# Patient Record
Sex: MALE | Race: OTHER | HISPANIC OR LATINO
[De-identification: names, ages, dates, MRNs, and addresses within clinical notes are randomized per-mention and may not be internally consistent; named-entity substitution may affect disease eponyms.]

---

## 2019-08-21 ENCOUNTER — TRANSCRIPTION ENCOUNTER (OUTPATIENT)
Age: 34
End: 2019-08-21

## 2022-03-31 ENCOUNTER — TRANSCRIPTION ENCOUNTER (OUTPATIENT)
Age: 37
End: 2022-03-31

## 2022-07-26 ENCOUNTER — INPATIENT (INPATIENT)
Facility: HOSPITAL | Age: 37
LOS: 0 days | Discharge: HOME | DRG: 603 | End: 2022-07-27
Attending: INTERNAL MEDICINE | Admitting: INTERNAL MEDICINE
Payer: COMMERCIAL

## 2022-07-26 VITALS
OXYGEN SATURATION: 98 % | DIASTOLIC BLOOD PRESSURE: 70 MMHG | SYSTOLIC BLOOD PRESSURE: 117 MMHG | RESPIRATION RATE: 16 BRPM | TEMPERATURE: 98 F | WEIGHT: 190.04 LBS | HEIGHT: 69 IN | HEART RATE: 59 BPM

## 2022-07-26 DIAGNOSIS — R21 RASH AND OTHER NONSPECIFIC SKIN ERUPTION: ICD-10-CM

## 2022-07-26 LAB
ALBUMIN SERPL ELPH-MCNC: 4.7 G/DL — SIGNIFICANT CHANGE UP (ref 3.3–5)
ALP SERPL-CCNC: 56 U/L — SIGNIFICANT CHANGE UP (ref 40–120)
ALT FLD-CCNC: 21 U/L — SIGNIFICANT CHANGE UP (ref 10–45)
ANION GAP SERPL CALC-SCNC: 16 MMOL/L — SIGNIFICANT CHANGE UP (ref 5–17)
AST SERPL-CCNC: 25 U/L — SIGNIFICANT CHANGE UP (ref 10–40)
BASOPHILS # BLD AUTO: 0.01 K/UL — SIGNIFICANT CHANGE UP (ref 0–0.2)
BASOPHILS NFR BLD AUTO: 0.1 % — SIGNIFICANT CHANGE UP (ref 0–2)
BILIRUB SERPL-MCNC: 0.8 MG/DL — SIGNIFICANT CHANGE UP (ref 0.2–1.2)
BUN SERPL-MCNC: 17 MG/DL — SIGNIFICANT CHANGE UP (ref 7–23)
CALCIUM SERPL-MCNC: 9.1 MG/DL — SIGNIFICANT CHANGE UP (ref 8.4–10.5)
CHLORIDE SERPL-SCNC: 101 MMOL/L — SIGNIFICANT CHANGE UP (ref 96–108)
CO2 SERPL-SCNC: 20 MMOL/L — LOW (ref 22–31)
CREAT SERPL-MCNC: 0.97 MG/DL — SIGNIFICANT CHANGE UP (ref 0.5–1.3)
EGFR: 102 ML/MIN/1.73M2 — SIGNIFICANT CHANGE UP
EOSINOPHIL # BLD AUTO: 0.1 K/UL — SIGNIFICANT CHANGE UP (ref 0–0.5)
EOSINOPHIL NFR BLD AUTO: 1 % — SIGNIFICANT CHANGE UP (ref 0–6)
GLUCOSE SERPL-MCNC: 86 MG/DL — SIGNIFICANT CHANGE UP (ref 70–99)
HCT VFR BLD CALC: 49.8 % — SIGNIFICANT CHANGE UP (ref 39–50)
HGB BLD-MCNC: 17.4 G/DL — HIGH (ref 13–17)
IMM GRANULOCYTES NFR BLD AUTO: 0.4 % — SIGNIFICANT CHANGE UP (ref 0–1.5)
LYMPHOCYTES # BLD AUTO: 2.11 K/UL — SIGNIFICANT CHANGE UP (ref 1–3.3)
LYMPHOCYTES # BLD AUTO: 20.8 % — SIGNIFICANT CHANGE UP (ref 13–44)
MCHC RBC-ENTMCNC: 31.4 PG — SIGNIFICANT CHANGE UP (ref 27–34)
MCHC RBC-ENTMCNC: 34.9 GM/DL — SIGNIFICANT CHANGE UP (ref 32–36)
MCV RBC AUTO: 89.7 FL — SIGNIFICANT CHANGE UP (ref 80–100)
MONOCYTES # BLD AUTO: 0.78 K/UL — SIGNIFICANT CHANGE UP (ref 0–0.9)
MONOCYTES NFR BLD AUTO: 7.7 % — SIGNIFICANT CHANGE UP (ref 2–14)
NEUTROPHILS # BLD AUTO: 7.09 K/UL — SIGNIFICANT CHANGE UP (ref 1.8–7.4)
NEUTROPHILS NFR BLD AUTO: 70 % — SIGNIFICANT CHANGE UP (ref 43–77)
NRBC # BLD: 0 /100 WBCS — SIGNIFICANT CHANGE UP (ref 0–0)
PLATELET # BLD AUTO: 263 K/UL — SIGNIFICANT CHANGE UP (ref 150–400)
POTASSIUM SERPL-MCNC: 3.7 MMOL/L — SIGNIFICANT CHANGE UP (ref 3.5–5.3)
POTASSIUM SERPL-SCNC: 3.7 MMOL/L — SIGNIFICANT CHANGE UP (ref 3.5–5.3)
PROT SERPL-MCNC: 7.6 G/DL — SIGNIFICANT CHANGE UP (ref 6–8.3)
RBC # BLD: 5.55 M/UL — SIGNIFICANT CHANGE UP (ref 4.2–5.8)
RBC # FLD: 12.1 % — SIGNIFICANT CHANGE UP (ref 10.3–14.5)
SARS-COV-2 RNA SPEC QL NAA+PROBE: SIGNIFICANT CHANGE UP
SODIUM SERPL-SCNC: 137 MMOL/L — SIGNIFICANT CHANGE UP (ref 135–145)
WBC # BLD: 10.13 K/UL — SIGNIFICANT CHANGE UP (ref 3.8–10.5)
WBC # FLD AUTO: 10.13 K/UL — SIGNIFICANT CHANGE UP (ref 3.8–10.5)

## 2022-07-26 PROCEDURE — 73070 X-RAY EXAM OF ELBOW: CPT | Mod: 26,50

## 2022-07-26 PROCEDURE — 71046 X-RAY EXAM CHEST 2 VIEWS: CPT | Mod: 26

## 2022-07-26 PROCEDURE — 99223 1ST HOSP IP/OBS HIGH 75: CPT | Mod: GC

## 2022-07-26 PROCEDURE — 73090 X-RAY EXAM OF FOREARM: CPT | Mod: 26,50

## 2022-07-26 PROCEDURE — 99285 EMERGENCY DEPT VISIT HI MDM: CPT

## 2022-07-26 RX ORDER — DEXAMETHASONE 0.5 MG/5ML
6 ELIXIR ORAL ONCE
Refills: 0 | Status: COMPLETED | OUTPATIENT
Start: 2022-07-26 | End: 2022-07-26

## 2022-07-26 RX ORDER — KETOROLAC TROMETHAMINE 30 MG/ML
15 SYRINGE (ML) INJECTION ONCE
Refills: 0 | Status: DISCONTINUED | OUTPATIENT
Start: 2022-07-26 | End: 2022-07-26

## 2022-07-26 RX ORDER — DIPHENHYDRAMINE HCL 50 MG
50 CAPSULE ORAL ONCE
Refills: 0 | Status: COMPLETED | OUTPATIENT
Start: 2022-07-26 | End: 2022-07-26

## 2022-07-26 RX ORDER — LORATADINE 10 MG/1
10 TABLET ORAL DAILY
Refills: 0 | Status: DISCONTINUED | OUTPATIENT
Start: 2022-07-26 | End: 2022-07-27

## 2022-07-26 RX ORDER — CEFTRIAXONE 500 MG/1
2000 INJECTION, POWDER, FOR SOLUTION INTRAMUSCULAR; INTRAVENOUS EVERY 24 HOURS
Refills: 0 | Status: DISCONTINUED | OUTPATIENT
Start: 2022-07-26 | End: 2022-07-27

## 2022-07-26 RX ORDER — FAMOTIDINE 10 MG/ML
20 INJECTION INTRAVENOUS DAILY
Refills: 0 | Status: DISCONTINUED | OUTPATIENT
Start: 2022-07-26 | End: 2022-07-27

## 2022-07-26 RX ORDER — CEFAZOLIN SODIUM 1 G
2000 VIAL (EA) INJECTION ONCE
Refills: 0 | Status: COMPLETED | OUTPATIENT
Start: 2022-07-26 | End: 2022-07-26

## 2022-07-26 RX ADMIN — FAMOTIDINE 20 MILLIGRAM(S): 10 INJECTION INTRAVENOUS at 20:45

## 2022-07-26 RX ADMIN — Medication 100 MILLIGRAM(S): at 18:55

## 2022-07-26 RX ADMIN — Medication 100 MILLIGRAM(S): at 13:42

## 2022-07-26 RX ADMIN — Medication 50 MILLIGRAM(S): at 13:27

## 2022-07-26 RX ADMIN — Medication 4 MILLIGRAM(S): at 20:45

## 2022-07-26 RX ADMIN — LORATADINE 10 MILLIGRAM(S): 10 TABLET ORAL at 20:45

## 2022-07-26 RX ADMIN — Medication 6 MILLIGRAM(S): at 13:46

## 2022-07-26 RX ADMIN — CEFTRIAXONE 100 MILLIGRAM(S): 500 INJECTION, POWDER, FOR SOLUTION INTRAMUSCULAR; INTRAVENOUS at 18:55

## 2022-07-26 RX ADMIN — Medication 15 MILLIGRAM(S): at 13:42

## 2022-07-26 NOTE — ED ADULT NURSE NOTE - OBJECTIVE STATEMENT
Patient is a 37 year old male complaining of bilateral arm swelling and itching. Arrived by EMS. Pt states he was performing water drills and swimming for [employer] when he noticed swelling and redness in his arms. Pt states he was only surface swimming. Pt denies any trauma to the area. Upon exam, redness and swelling is symmetrical and skin is warm to touch. Denies numbness or tinlging in the extremities. Pt reports no PMH. Patient is A&O x4. Denies complaints of chest pain, sob, fevers, chills, n/v/d, headache, syncope, burning uriantion, blood in urine, blood in stool. Safety and comfort maintained. Will continue to monitor.

## 2022-07-26 NOTE — H&P ADULT - ATTENDING COMMENTS
37M [employer] was doing drills and swimming earlier today 7/26/22  when he noticed he had some swelling and redness of b/l forearms.  Did not hit them on anything, didn't have any trauma, no new exposures, was only surface swimming was not diving.  Redness and swelling is symmetrical associated with feeing tense in his forearms. No numbness distal to swelling.  No new soaps, detergents, pets, or any other known exposures.  Never had anything like this before, no swelling of lips or tongue, no difficulty breathing or swallowing.  Other people were in the water with him but he is the only one with a rash.  Nothing makes it better or worse.  no fevers no chills.  + Pruritic    DIAGNOSIS and IMPRESSION  B/L forearm Cellulitis    Afebrile, no leukocytosis, non toxic  No pain, significant itching, symmetrical lesion w/o wound  Pt did have a bug bite today   - while atyplcal presentation of cellulitis with possible waterborne organism is possible, the distribution and absence of clinical toxicity suggests a toxic exposure  skin irritation can result from exposure to toxins associated with algae. In salt and brackish water Diatoms and Dinoflagellates are implicated    RECOMMENDATIONS  DERMATOLOGY CONSULTATION  Recommend Blood cx x 2  Recommend CT with Contrast of B/L UE  Recommend Doxycycline 100mg BID PO and Ceftriaxone 2gm Q24 to cover for possible water borne pathogens (Vibrio vulnificus, Strep, aeromonas, erysipelothrix rhusiopathiae)

## 2022-07-26 NOTE — ED PROVIDER NOTE - ATTENDING CONTRIBUTION TO CARE
Attending (Gianni Mishra D.O.):  I have personally seen and examined this patient. I have performed a substantive portion of the visit including all aspects of the medical decision making. Resident, fellow, and/or ACP note reviewed. I agree on the plan of care except where noted.    see mdm

## 2022-07-26 NOTE — ED PROVIDER NOTE - NS ED ATTENDING STATEMENT MOD
I have seen and examined this patient and fully participated in the care of this patient as the teaching attending.  The service was shared with the ISABELLE.  I reviewed and verified the documentation and independently performed the documented:

## 2022-07-26 NOTE — CONSULT NOTE ADULT - ASSESSMENT
Mr. Drummond is a 37 year old gentleman [employer] was doing drills and swimming earlier today when he noticed he had some swelling and redness of b/l forearms.  Did not hit them on anything, didn't have any trauma, no new exposures, was only surface swimming was not diving.  Redness and swelling is symmetrical associated with feeing tense in his forearms. No numbness distal to swelling.  No new soaps, detergents, pets, or any other known exposures.  Never had anything like this before, no swelling of lips or tongue, no difficulty breathing or swallowing.  Other people were in the water with him but he is the only one with a rash.  Nothing makes it better or worse.  no fevers no chills.  + Pruritic  Mr. Drummond is a 37 year old gentleman [employer] was doing drills and swimming earlier today when he noticed he had some swelling and redness of b/l forearms.  Did not hit them on anything, didn't have any trauma, no new exposures, was only surface swimming was not diving.  Redness and swelling is symmetrical associated with feeing tense in his forearms. No numbness distal to swelling.  No new soaps, detergents, pets, or any other known exposures.  Never had anything like this before, no swelling of lips or tongue, no difficulty breathing or swallowing.  Other people were in the water with him but he is the only one with a rash.  Nothing makes it better or worse.  no fevers no chills.  + Pruritic    DIAGNOSIS and IMPRESSION  ·	B/L forearm Cellulitis    Afebrile, no leukocytosis, non toxic  No pain, significant itching, symmetrical lesion w/o wound  Pt did have a bug bite today    RECOMMENDATIONS  Recommend Blood cx x 2  Recommend CT with Contrast of B/L UE  Recommend Derm consult r/o non-infectious etiology given nontoxic presentation and significant pruritis  Recommend Doxycycline 100mg BID PO and Ceftriaxone 2gm Q24 to cover for possible water borne pathogens (Vibrio vulnificus, Strep, aeromonas, erysipelothrix rhusiopathiae)    Pt seen and examined. Case d/w attending and primary team    Lopez Hernandez MD, PGY5  ID fellow  [employer] Teams Preferred  After 5pm/weekends call [phone]

## 2022-07-26 NOTE — H&P ADULT - ASSESSMENT
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  He did not hit them on anything, didn't have any trauma, no new exposures, was only surface swimming was not diving.  Redness and swelling is symmetrical associated with feeing tense in his forearms. No numbness distal to swelling.  No new soaps, detergents, pets, or any other known exposures.  Never had anything like this before, no swelling of lips or tongue, no difficulty breathing or swallowing.  Other people were in the water with him but he is the only one with a rash.  Nothing makes it better or worse.  no fevers no chills.  + Pruritic    DIAGNOSIS and IMPRESSION  ·	B/L forearm Cellulitis    Afebrile, no leukocytosis, non toxic  No pain, significant itching, symmetrical lesion w/o wound  Pt did have a bug bite today  area of erythema and edema was demarcated  ID consult called and reviewed   blood cx x 2 sent  CT w contrast b/l UE  derm consult called to r/o non-infectious etiology given nontoxic presentation and significant pruritis  ptn received decadron, ancef, benadryl in the ED  as per ID: will place on Doxycycline 100mg BID PO and Ceftriaxone 2gm Q24 to cover for possible water borne pathogens (Vibrio vulnificus, Strep, aeromonas, erysipelothrix rhusiopathiae)  will cont steroids  will start H1 and H2 blocking antihistamines  dvt ppx w ambulation

## 2022-07-26 NOTE — ED PROVIDER NOTE - PROGRESS NOTE DETAILS
Pt doing well no complaints.  Some increased erythema now spreading down to the hand.  Arms still soft and compartments are non tender.  No numbness tingling in the hands.  [place] Attending (Gianni Mishra D.O.):  Slight increased erythema tracking along right dorsal surface of hand from forearm., No focal crepitations. Will get xray to eval bones. Compartments soft but edema present.

## 2022-07-26 NOTE — H&P ADULT - NSHPPHYSICALEXAM_GEN_ALL_CORE
T(F): 98.4 (07-26-22 @ 19:57), Max: 98.4 (07-26-22 @ 19:57)  HR: 77 (07-26-22 @ 19:57) (59 - 77)  BP: 113/71 (07-26-22 @ 19:57) (113/71 - 117/70)  RR: 18 (07-26-22 @ 19:57) (16 - 18)  SpO2: 96% (07-26-22 @ 19:57) (96% - 98%)    PHYSICAL EXAM:  GENERAL: NAD, well-developed  HEAD:  Atraumatic, Normocephalic  EYES: EOMI, PERRLA, conjunctiva and sclera clear  NECK: Supple, No JVD  CHEST/LUNG: Clear to auscultation bilaterally; No wheeze  HEART: Regular rate and rhythm; No murmurs, rubs, or gallops  ABDOMEN: Soft, Nontender, Nondistended; Bowel sounds present  EXTREMITIES:  2+ Peripheral Pulses, No clubbing, cyanosis, or edema  PSYCH: AAOx3  NEUROLOGY: non-focal  SKIN: No rashes or lesions

## 2022-07-26 NOTE — ED PROVIDER NOTE - MUSCULOSKELETAL, MLM
b/l UE's full ROM.  + olecranon effusion palpable b/l.  b/l forearms tense and swollen but no pain with palpation of forearms and biceps and compartments are all soft

## 2022-07-26 NOTE — ED PROVIDER NOTE - NSFOLLOWUPINSTRUCTIONS_ED_ALL_ED_FT
1- Prednisone 40 mg daily for the next 4 days  2- Keflex 500 mg every 8 hours for the next 10 days  3- Follow up with your doctor or our dermatology clinic  4- If you have any worsening rash, swelling,. numbness/tingling, pain, fevers, or any new or worsening concerns come back to the ER immediately

## 2022-07-26 NOTE — CONSULT NOTE ADULT - SUBJECTIVE AND OBJECTIVE BOX
Patient is a 37y old  Male who presents with a chief complaint of     HPI: Mr. Drummond is a 37 year old gentleman [employer] was doing drills and swimming earlier today when he noticed he had some swelling and redness of b/l forearms.  Did not hit them on anything, didn't have any trauma, no new exposures, was only surface swimming was not diving.  Redness and swelling is symmetrical associated with feeing tense in his forearms. No numbness distal to swelling.  No new soaps, detergents, pets, or any other known exposures.  Never had anything like this before, no swelling of lips or tongue, no difficulty breathing or swallowing.  Other people were in the water with him but he is the only one with a rash.  Nothing makes it better or worse.  no fevers no chills.  + Pruritic       REVIEW OF SYSTEMS  [  ] ROS unobtainable because:    [ x ] All other systems negative except as noted below    Constitutional:  [ ] fever [ ] chills  [ ] weight loss  [ ]night sweat  [ ]poor appetite/PO intake [ ]fatigue   Skin:  [ ] rash [ ] phlebitis	  Eyes: [ ] icterus [ ] pain  [ ] discharge	  ENMT: [ ] sore throat  [ ] thrush [ ] ulcers [ ] exudates [ ]anosmia  Respiratory: [ ] dyspnea [ ] hemoptysis [ ] cough [ ] sputum	  Cardiovascular:  [ ] chest pain [ ] palpitations [ ] edema	  Gastrointestinal:  [ ] nausea [ ] vomiting [ ] diarrhea [ ] constipation [ ] pain	  Genitourinary:  [ ] dysuria [ ] frequency [ ] hematuria [ ] discharge [ ] flank pain  [ ] incontinence  Musculoskeletal:  [ ] myalgias [ ] arthralgias [ ] arthritis  [ ] back pain  Neurological:  [ ] headache [ ] weakness [ ] seizures  [ ] confusion/altered mental status    prior hospital charts reviewed [V]  primary team notes reviewed [V]  other consultant notes reviewed [V]    PAST MEDICAL & SURGICAL HISTORY:      SOCIAL HISTORY:  - Denied smoking/vaping/alcohol/recreational drug use    FAMILY HISTORY:      Allergies  No Known Allergies        ANTIMICROBIALS:      ANTIMICROBIALS (past 90 days):  MEDICATIONS  (STANDING):  ceFAZolin   IVPB   100 mL/Hr IV Intermittent (07-26-22 @ 13:42)        OTHER MEDS:   MEDICATIONS  (STANDING):      VITALS:  Vital Signs Last 24 Hrs  T(F): 97.7 (07-26-22 @ 12:50), Max: 97.7 (07-26-22 @ 12:50)    Vital Signs Last 24 Hrs  HR: 59 (07-26-22 @ 12:50) (59 - 59)  BP: 117/70 (07-26-22 @ 12:50) (117/70 - 117/70)  RR: 16 (07-26-22 @ 12:50)  SpO2: 98% (07-26-22 @ 12:50) (98% - 98%)  Wt(kg): --    EXAM:    GA: NAD, AOx3  HEENT: oral cavity no lesion  CV: nl S1/S2, no RMG  Lungs: CTAB, No distress  Abd: BS+, soft, nontender, no rebounding pain  Ext: no edema  Neuro: No focal deficits  Skin: Intact  IV: no phlebitis    Labs:                        17.4   10.13 )-----------( 263      ( 26 Jul 2022 14:23 )             49.8     07-26    137  |  101  |  17  ----------------------------<  86  3.7   |  20<L>  |  0.97    Ca    9.1      26 Jul 2022 14:23    TPro  7.6  /  Alb  4.7  /  TBili  0.8  /  DBili  x   /  AST  25  /  ALT  21  /  AlkPhos  56  07-26      WBC Trend:  WBC Count: 10.13 (07-26-22 @ 14:23)      Auto Neutrophil #: 7.09 K/uL (07-26-22 @ 14:23)      Creatine Trend:  Creatinine, Serum: 0.97 (07-26)      Liver Biochemical Testing Trend:  Alanine Aminotransferase (ALT/SGPT): 21 (07-26)  Aspartate Aminotransferase (AST/SGOT): 25 (07-26-22 @ 14:23)  Bilirubin Total, Serum: 0.8 (07-26)  Trend LDH  Auto Eosinophil %: 1.0 % (07-26-22 @ 14:23)      MICROBIOLOGY:    C-Reactive Protein, Serum: 21 (07-26)      RADIOLOGY:  imaging below personally reviewed     Patient is a 37y old  Male who presents with a chief complaint of     HPI: Mr. Drummond is a 37 year old gentleman with no PMH who is a [employer] who [employer] was doing drills and swimming earlier today when he noticed he had some swelling and redness of b/l forearms.  Did not hit them on anything, didn't have any trauma, no new exposures, was only surface swimming was not diving.  Redness and swelling is symmetrical associated with feeing tense in his forearms. No numbness distal to swelling.  No new soaps, detergents, pets, or any other known exposures.  Never had anything like this before, no swelling of lips or tongue, no difficulty breathing or swallowing.  Other people were in the water with him but he is the only one with a rash.  Nothing makes it better or worse.  no fevers no chills.  + Pruritic       REVIEW OF SYSTEMS  [  ] ROS unobtainable because:    [ x ] All other systems negative except as noted below    Constitutional:  [ ] fever [ ] chills  [ ] weight loss  [ ]night sweat  [ ]poor appetite/PO intake [ ]fatigue   Skin:  [ ] rash [ ] phlebitis	  Eyes: [ ] icterus [ ] pain  [ ] discharge	  ENMT: [ ] sore throat  [ ] thrush [ ] ulcers [ ] exudates [ ]anosmia  Respiratory: [ ] dyspnea [ ] hemoptysis [ ] cough [ ] sputum	  Cardiovascular:  [ ] chest pain [ ] palpitations [ ] edema	  Gastrointestinal:  [ ] nausea [ ] vomiting [ ] diarrhea [ ] constipation [ ] pain	  Genitourinary:  [ ] dysuria [ ] frequency [ ] hematuria [ ] discharge [ ] flank pain  [ ] incontinence  Musculoskeletal:  [ ] myalgias [ ] arthralgias [ ] arthritis  [ ] back pain  Neurological:  [ ] headache [ ] weakness [ ] seizures  [ ] confusion/altered mental status    prior hospital charts reviewed [V]  primary team notes reviewed [V]  other consultant notes reviewed [V]    PAST MEDICAL & SURGICAL HISTORY:      SOCIAL HISTORY:  - Denied smoking/vaping/alcohol/recreational drug use    FAMILY HISTORY:      Allergies  No Known Allergies        ANTIMICROBIALS:      ANTIMICROBIALS (past 90 days):  MEDICATIONS  (STANDING):  ceFAZolin   IVPB   100 mL/Hr IV Intermittent (07-26-22 @ 13:42)        OTHER MEDS:   MEDICATIONS  (STANDING):      VITALS:  Vital Signs Last 24 Hrs  T(F): 97.7 (07-26-22 @ 12:50), Max: 97.7 (07-26-22 @ 12:50)    Vital Signs Last 24 Hrs  HR: 59 (07-26-22 @ 12:50) (59 - 59)  BP: 117/70 (07-26-22 @ 12:50) (117/70 - 117/70)  RR: 16 (07-26-22 @ 12:50)  SpO2: 98% (07-26-22 @ 12:50) (98% - 98%)  Wt(kg): --    EXAM:    GA: NAD, AOx3  HEENT: oral cavity no lesion  CV: nl S1/S2, no RMG  Lungs: CTAB, No distress  Abd: BS+, soft, nontender, no rebounding pain  Ext: no edema  Neuro: No focal deficits  Skin: Intact  IV: no phlebitis    Labs:                        17.4   10.13 )-----------( 263      ( 26 Jul 2022 14:23 )             49.8     07-26    137  |  101  |  17  ----------------------------<  86  3.7   |  20<L>  |  0.97    Ca    9.1      26 Jul 2022 14:23    TPro  7.6  /  Alb  4.7  /  TBili  0.8  /  DBili  x   /  AST  25  /  ALT  21  /  AlkPhos  56  07-26      WBC Trend:  WBC Count: 10.13 (07-26-22 @ 14:23)      Auto Neutrophil #: 7.09 K/uL (07-26-22 @ 14:23)      Creatine Trend:  Creatinine, Serum: 0.97 (07-26)      Liver Biochemical Testing Trend:  Alanine Aminotransferase (ALT/SGPT): 21 (07-26)  Aspartate Aminotransferase (AST/SGOT): 25 (07-26-22 @ 14:23)  Bilirubin Total, Serum: 0.8 (07-26)  Trend LDH  Auto Eosinophil %: 1.0 % (07-26-22 @ 14:23)      MICROBIOLOGY:    C-Reactive Protein, Serum: 21 (07-26)      RADIOLOGY:  imaging below personally reviewed     Patient is a 37y old  Male who presents with a chief complaint of     HPI: Mr. Drummond is a 37 year old gentleman with no PMH who is a [employer] who [employer] was doing drills and swimming earlier today when he noticed he had some swelling and redness of b/l forearms.  Did not hit them on anything, didn't have any trauma, no new exposures, was only surface swimming was not diving in brackish [place].  Redness and swelling is symmetrical associated with feeing tense in his forearms. No numbness distal to swelling.  No new soaps, detergents, pets, or any other known exposures. Other people were in the water with him but he is the only one with a rash.  No fevers no chills. He does note significant pruritis.        REVIEW OF SYSTEMS  [  ] ROS unobtainable because:    [ x ] All other systems negative except as noted below    Constitutional:  [ ] fever [ ] chills  [ ] weight loss  [ ]night sweat  [ ]poor appetite/PO intake [ ]fatigue   Skin:  [ ] rash [ ] phlebitis	  Eyes: [ ] icterus [ ] pain  [ ] discharge	  ENMT: [ ] sore throat  [ ] thrush [ ] ulcers [ ] exudates [ ]anosmia  Respiratory: [ ] dyspnea [ ] hemoptysis [ ] cough [ ] sputum	  Cardiovascular:  [ ] chest pain [ ] palpitations [ ] edema	  Gastrointestinal:  [ ] nausea [ ] vomiting [ ] diarrhea [ ] constipation [ ] pain	  Genitourinary:  [ ] dysuria [ ] frequency [ ] hematuria [ ] discharge [ ] flank pain  [ ] incontinence  Musculoskeletal:  [ ] myalgias [ ] arthralgias [ ] arthritis  [ ] back pain  Neurological:  [ ] headache [ ] weakness [ ] seizures  [ ] confusion/altered mental status    prior hospital charts reviewed [V]  primary team notes reviewed [V]  other consultant notes reviewed [V]    PAST MEDICAL & SURGICAL HISTORY:  No significant PMH    SOCIAL HISTORY:  - Denied smoking/vaping/alcohol/recreational drug use    FAMILY HISTORY:  DM and breast CA in mothers side    Allergies  No Known Allergies        ANTIMICROBIALS:      ANTIMICROBIALS (past 90 days):  MEDICATIONS  (STANDING):  ceFAZolin   IVPB   100 mL/Hr IV Intermittent (07-26-22 @ 13:42)        OTHER MEDS:   MEDICATIONS  (STANDING):      VITALS:  Vital Signs Last 24 Hrs  T(F): 97.7 (07-26-22 @ 12:50), Max: 97.7 (07-26-22 @ 12:50)    Vital Signs Last 24 Hrs  HR: 59 (07-26-22 @ 12:50) (59 - 59)  BP: 117/70 (07-26-22 @ 12:50) (117/70 - 117/70)  RR: 16 (07-26-22 @ 12:50)  SpO2: 98% (07-26-22 @ 12:50) (98% - 98%)  Wt(kg): --    EXAM:    Constitutional: Not in acute distress  Eyes: pupils bilaterally reactive to light. No icterus.  Oral cavity: Clear, no lesions  Neck: No neck vein distension noted  RS: Chest clear to auscultation bilaterally. No wheeze/rhonchi/crepitations.  CVS: S1, S2 heard. Regular rate and rhythm. No murmurs/rubs/gallops.  Abdomen: Soft. No guarding/rigidity/tenderness.  : No acute abnormalities  Extremities: B/L Forearm and elbow symmetrical redness, swelling. No limitation of ROM. No warmth or tenderness or wounds. Left axillary bug bite with erythema  Skin: No lesions noted  Vascular: No evidence of phlebitis  Neuro: Alert, oriented to time/place/person  Cranial nerves 2-12 grossly normal. No focal abnormalities    Labs:                        17.4   10.13 )-----------( 263      ( 26 Jul 2022 14:23 )             49.8     07-26    137  |  101  |  17  ----------------------------<  86  3.7   |  20<L>  |  0.97    Ca    9.1      26 Jul 2022 14:23    TPro  7.6  /  Alb  4.7  /  TBili  0.8  /  DBili  x   /  AST  25  /  ALT  21  /  AlkPhos  56  07-26      WBC Trend:  WBC Count: 10.13 (07-26-22 @ 14:23)      Auto Neutrophil #: 7.09 K/uL (07-26-22 @ 14:23)      Creatine Trend:  Creatinine, Serum: 0.97 (07-26)      Liver Biochemical Testing Trend:  Alanine Aminotransferase (ALT/SGPT): 21 (07-26)  Aspartate Aminotransferase (AST/SGOT): 25 (07-26-22 @ 14:23)  Bilirubin Total, Serum: 0.8 (07-26)  Trend LDH  Auto Eosinophil %: 1.0 % (07-26-22 @ 14:23)      MICROBIOLOGY:    C-Reactive Protein, Serum: 21 (07-26)      RADIOLOGY:  imaging below personally reviewed     Patient is a 37y old  Male who presents with a chief complaint of     HPI: Mr. 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  Did not hit them on anything, didn't have any trauma, no new exposures, was only surface swimming was not diving in brackish water by [place].  Redness and swelling is symmetrical associated with feeing tense in his forearms. No numbness distal to swelling.  No new soaps, detergents, pets, or any other known exposures. Other people were in the water with him but he is the only one with a rash.  No fevers no chills. He does note significant pruritis.   no others had reaction  water appeared murky (current heatwave)  he is alert, comfortable without pain  - notes local pruritus       REVIEW OF SYSTEMS  [  ] ROS unobtainable because:    [ x ] All other systems negative except as noted below    Constitutional:  [ ] fever [ ] chills  [ ] weight loss  [ ]night sweat  [ ]poor appetite/PO intake [ ]fatigue   Skin:  [ ] rash [ ] phlebitis	  Eyes: [ ] icterus [ ] pain  [ ] discharge	  ENMT: [ ] sore throat  [ ] thrush [ ] ulcers [ ] exudates [ ]anosmia  Respiratory: [ ] dyspnea [ ] hemoptysis [ ] cough [ ] sputum	  Cardiovascular:  [ ] chest pain [ ] palpitations [ ] edema	  Gastrointestinal:  [ ] nausea [ ] vomiting [ ] diarrhea [ ] constipation [ ] pain	  Genitourinary:  [ ] dysuria [ ] frequency [ ] hematuria [ ] discharge [ ] flank pain  [ ] incontinence  Musculoskeletal:  [ ] myalgias [ ] arthralgias [ ] arthritis  [ ] back pain  Neurological:  [ ] headache [ ] weakness [ ] seizures  [ ] confusion/altered mental status    prior hospital charts reviewed [V]  primary team notes reviewed [V]  other consultant notes reviewed [V]    PAST MEDICAL & SURGICAL HISTORY:  No significant PMH    SOCIAL HISTORY:  - Denied smoking/vaping/alcohol/recreational drug use    FAMILY HISTORY:  DM and breast CA in mothers side    Allergies  No Known Allergies        ANTIMICROBIALS:      ANTIMICROBIALS (past 90 days):  MEDICATIONS  (STANDING):  ceFAZolin   IVPB   100 mL/Hr IV Intermittent (07-26-22 @ 13:42)        OTHER MEDS:   MEDICATIONS  (STANDING):      VITALS:  Vital Signs Last 24 Hrs  T(F): 97.7 (07-26-22 @ 12:50), Max: 97.7 (07-26-22 @ 12:50)    Vital Signs Last 24 Hrs  HR: 59 (07-26-22 @ 12:50) (59 - 59)  BP: 117/70 (07-26-22 @ 12:50) (117/70 - 117/70)  RR: 16 (07-26-22 @ 12:50)  SpO2: 98% (07-26-22 @ 12:50) (98% - 98%)  Wt(kg): --    EXAM:    Constitutional: Not in acute distress  Eyes: pupils bilaterally reactive to light. No icterus.  Oral cavity: Clear, no lesions  Neck: No neck vein distension noted  RS: Chest clear to auscultation bilaterally. No wheeze/rhonchi/crepitations.  CVS: S1, S2 heard. Regular rate and rhythm. No murmurs/rubs/gallops.  Abdomen: Soft. No guarding/rigidity/tenderness.  : No acute abnormalities  Extremities: B/L Forearm and elbow symmetrical redness, swelling. No limitation of ROM. No warmth or tenderness or wounds. Left axillary bug bite with erythema  Skin: No lesions noted  Vascular: No evidence of phlebitis  Neuro: Alert, oriented to time/place/person  Cranial nerves 2-12 grossly normal. No focal abnormalities    Labs:                        17.4   10.13 )-----------( 263      ( 26 Jul 2022 14:23 )    1.0% Eos             49.8     07-26    137  |  101  |  17  ----------------------------<  86  3.7   |  20<L>  |  0.97    Ca    9.1      26 Jul 2022 14:23    TPro  7.6  /  Alb  4.7  /  TBili  0.8  /  DBili  x   /  AST  25  /  ALT  21  /  AlkPhos  56  07-26      WBC Trend:  WBC Count: 10.13 (07-26-22 @ 14:23)      Auto Neutrophil #: 7.09 K/uL (07-26-22 @ 14:23)      Creatine Trend:  Creatinine, Serum: 0.97 (07-26)      Liver Biochemical Testing Trend:  Alanine Aminotransferase (ALT/SGPT): 21 (07-26)  Aspartate Aminotransferase (AST/SGOT): 25 (07-26-22 @ 14:23)  Bilirubin Total, Serum: 0.8 (07-26)  Trend LDH  Auto Eosinophil %: 1.0 % (07-26-22 @ 14:23)      MICROBIOLOGY:    C-Reactive Protein, Serum: 21 (07-26)      RADIOLOGY:  imaging below personally reviewed

## 2022-07-26 NOTE — H&P ADULT - HISTORY OF PRESENT ILLNESS
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  Did not hit them on anything, didn't have any trauma, no new exposures, was only surface swimming was not diving in brackish water by the de [place]a.  Redness and swelling is symmetrical associated with feeing tense in his forearms. No numbness distal to swelling.  No new soaps, detergents, pets, or any other known exposures. Other people were in the water with him but he is the only one with a rash.  No fevers no chills. He does note significant pruritis.   no others had reaction  water appeared murky (current heatwave)  he is alert, comfortable without pain  - notes local pruritus on posterior forearms w edema

## 2022-07-26 NOTE — ED PROVIDER NOTE - CARE PLAN
Principal Discharge DX:	Rash   1 Principal Discharge DX:	Rash  Secondary Diagnosis:	Cellulitis of upper extremity

## 2022-07-26 NOTE — ED PROVIDER NOTE - OBJECTIVE STATEMENT
37 y.o. male [employer] was doing drills and swimming earlier today when he noticed he had some swelling and redness of b/l forearms.  Did not hit them on anything, didn't have any trauma, no new exposures, was only surface swimming was not diving.  Redness and swelling is symmetrical associated with feeing tense in his forearms.  No numbness distal to swelling.  No new soaps, detergents, pets, or any other known exposures.  Never had anything like this before, no swelling of lips or tongue, no difficulty breathing or swallowing.  Other people were in the water with him but he is the only one with a rash.  Nohting makes it better or worse.  no fevers no chills.  + Pruritic

## 2022-07-26 NOTE — ED PROVIDER NOTE - CLINICAL SUMMARY MEDICAL DECISION MAKING FREE TEXT BOX
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 Usual state of health prior. Denies new detergents, creams, trauma. Never happened before. Feeling well otherwise. Hemodynamically stable. Neurovasc intact. + blanching erythema of bilateral elbow regions with effusions bilateral. Full range of motion of bilateral UE at hands/fingers, wrist, elbow, shoulders. Nontender extremities. Likely nonseptic bursitis. Do not suspect fx. Question of edema 2/2 exertion/swimming/overuse. Will screen with labs, crp, give abxs, antipruritic.

## 2022-07-27 ENCOUNTER — TRANSCRIPTION ENCOUNTER (OUTPATIENT)
Age: 37
End: 2022-07-27

## 2022-07-27 VITALS
DIASTOLIC BLOOD PRESSURE: 71 MMHG | SYSTOLIC BLOOD PRESSURE: 111 MMHG | HEART RATE: 61 BPM | RESPIRATION RATE: 18 BRPM | OXYGEN SATURATION: 98 % | TEMPERATURE: 98 F

## 2022-07-27 LAB
ANION GAP SERPL CALC-SCNC: 12 MMOL/L — SIGNIFICANT CHANGE UP (ref 5–17)
BUN SERPL-MCNC: 15 MG/DL — SIGNIFICANT CHANGE UP (ref 7–23)
CALCIUM SERPL-MCNC: 9.5 MG/DL — SIGNIFICANT CHANGE UP (ref 8.4–10.5)
CHLORIDE SERPL-SCNC: 100 MMOL/L — SIGNIFICANT CHANGE UP (ref 96–108)
CO2 SERPL-SCNC: 23 MMOL/L — SIGNIFICANT CHANGE UP (ref 22–31)
CREAT SERPL-MCNC: 0.91 MG/DL — SIGNIFICANT CHANGE UP (ref 0.5–1.3)
EGFR: 111 ML/MIN/1.73M2 — SIGNIFICANT CHANGE UP
GLUCOSE SERPL-MCNC: 129 MG/DL — HIGH (ref 70–99)
HCT VFR BLD CALC: 50.6 % — HIGH (ref 39–50)
HGB BLD-MCNC: 17.3 G/DL — HIGH (ref 13–17)
MCHC RBC-ENTMCNC: 31.2 PG — SIGNIFICANT CHANGE UP (ref 27–34)
MCHC RBC-ENTMCNC: 34.2 GM/DL — SIGNIFICANT CHANGE UP (ref 32–36)
MCV RBC AUTO: 91.3 FL — SIGNIFICANT CHANGE UP (ref 80–100)
NRBC # BLD: 0 /100 WBCS — SIGNIFICANT CHANGE UP (ref 0–0)
PLATELET # BLD AUTO: 283 K/UL — SIGNIFICANT CHANGE UP (ref 150–400)
POTASSIUM SERPL-MCNC: 4.2 MMOL/L — SIGNIFICANT CHANGE UP (ref 3.5–5.3)
POTASSIUM SERPL-SCNC: 4.2 MMOL/L — SIGNIFICANT CHANGE UP (ref 3.5–5.3)
RBC # BLD: 5.54 M/UL — SIGNIFICANT CHANGE UP (ref 4.2–5.8)
RBC # FLD: 12.1 % — SIGNIFICANT CHANGE UP (ref 10.3–14.5)
SODIUM SERPL-SCNC: 135 MMOL/L — SIGNIFICANT CHANGE UP (ref 135–145)
WBC # BLD: 12.12 K/UL — HIGH (ref 3.8–10.5)
WBC # FLD AUTO: 12.12 K/UL — HIGH (ref 3.8–10.5)

## 2022-07-27 PROCEDURE — 73202 CT UPPR EXTREMITY W/O&W/DYE: CPT | Mod: 26,50

## 2022-07-27 PROCEDURE — 80053 COMPREHEN METABOLIC PANEL: CPT

## 2022-07-27 PROCEDURE — 87040 BLOOD CULTURE FOR BACTERIA: CPT

## 2022-07-27 PROCEDURE — 86140 C-REACTIVE PROTEIN: CPT

## 2022-07-27 PROCEDURE — 99285 EMERGENCY DEPT VISIT HI MDM: CPT

## 2022-07-27 PROCEDURE — 99232 SBSQ HOSP IP/OBS MODERATE 35: CPT

## 2022-07-27 PROCEDURE — 71046 X-RAY EXAM CHEST 2 VIEWS: CPT

## 2022-07-27 PROCEDURE — 96375 TX/PRO/DX INJ NEW DRUG ADDON: CPT

## 2022-07-27 PROCEDURE — U0005: CPT

## 2022-07-27 PROCEDURE — 73202 CT UPPR EXTREMITY W/O&W/DYE: CPT

## 2022-07-27 PROCEDURE — U0003: CPT

## 2022-07-27 PROCEDURE — 80048 BASIC METABOLIC PNL TOTAL CA: CPT

## 2022-07-27 PROCEDURE — 73070 X-RAY EXAM OF ELBOW: CPT

## 2022-07-27 PROCEDURE — 85025 COMPLETE CBC W/AUTO DIFF WBC: CPT

## 2022-07-27 PROCEDURE — 85027 COMPLETE CBC AUTOMATED: CPT

## 2022-07-27 PROCEDURE — 73090 X-RAY EXAM OF FOREARM: CPT

## 2022-07-27 PROCEDURE — 96374 THER/PROPH/DIAG INJ IV PUSH: CPT

## 2022-07-27 RX ADMIN — Medication 100 MILLIGRAM(S): at 06:18

## 2022-07-27 RX ADMIN — FAMOTIDINE 20 MILLIGRAM(S): 10 INJECTION INTRAVENOUS at 12:15

## 2022-07-27 RX ADMIN — Medication 20 MILLIGRAM(S): at 09:02

## 2022-07-27 RX ADMIN — LORATADINE 10 MILLIGRAM(S): 10 TABLET ORAL at 12:15

## 2022-07-27 RX ADMIN — Medication 20 MILLIGRAM(S): at 18:17

## 2022-07-27 RX ADMIN — Medication 4 MILLIGRAM(S): at 05:25

## 2022-07-27 NOTE — CONSULT NOTE ADULT - ASSESSMENT
Favor contact dermatitis, explained to patient that exact trigger is often difficult to elucidate. Bilateral cellulitis in this well-appearing patient would be unlikely.  -recommend continuing with steroids- consider prednisone 60mg daily x 7 days  -If not improving or recurs, advised patient to f/u up with our outpatient clinic located at [address] [place] [address] [address], [place], [place] ([phone]). Dermatology to coordinate appointment.    The patient's chart was reviewed in addition to being seen and examined at bedside with the dermatology attending Dr. Spears. Recommendations were communicated with the primary team.  Please page [phone] w/10 digit call back number for further related questions.    Akil Jalloh MD  Resident Physician, PGY3  [facility] Dermatology  Pager: [phone]  Office: [phone]

## 2022-07-27 NOTE — DISCHARGE NOTE NURSING/CASE MANAGEMENT/SOCIAL WORK - NSDCPEFALRISK_GEN_ALL_CORE
For information on Fall & Injury Prevention, visit: [email or web address] OR  [email or web address] OR  https://www.cdc.gov/steadi/patient.html

## 2022-07-27 NOTE — DISCHARGE NOTE NURSING/CASE MANAGEMENT/SOCIAL WORK - PATIENT PORTAL LINK FT
You can access the FollowMyHealth Patient Portal offered by [facility] by registering at the following website: [email or web address]. By joining [employer]’s FollowMyHealth portal, you will also be able to view your health information using other applications (apps) compatible with our system.

## 2022-07-27 NOTE — CONSULT NOTE ADULT - ATTENDING COMMENTS
Contact dermatitis is favored. Source unclear, but possibly an exposure to a plant or in the water based on hx. Pt noted significant improvement with methylprednisolone. Cont 7 days of prednisone. Advised outpt f/u as recurrence of rash is possible after completing course of prednisone.     Reginald Spears MD, PharmD, MPH  Co-Director, Inpatient Dermatology Consultation Service, [facility]
37M [employer] was doing drills and swimming earlier today 7/26/22  when he noticed he had some swelling and redness of b/l forearms.  Did not hit them on anything, didn't have any trauma, no new exposures, was only surface swimming was not diving.  Redness and swelling is symmetrical associated with feeing tense in his forearms. No numbness distal to swelling.  No new soaps, detergents, pets, or any other known exposures.  Never had anything like this before, no swelling of lips or tongue, no difficulty breathing or swallowing.  Other people were in the water with him but he is the only one with a rash.  Nothing makes it better or worse.  no fevers no chills.  + Pruritic    DIAGNOSIS and IMPRESSION  B/L forearm Cellulitis    Afebrile, no leukocytosis, non toxic  No pain, significant itching, symmetrical lesion w/o wound  Pt did have a bug bite today   - while atyplcal presentation of cellulitis with possible waterborne organism is possible, the distribution and absence of clinical toxicity suggests a toxic exposure  skin irritation can result from exposure to toxins associated with algae. In salt and brackish water Diatoms and Dinoflagellates are implicated    RECOMMENDATIONS  DERMATOLOGY CONSULTATION  Recommend Blood cx x 2  Recommend CT with Contrast of B/L UE  Recommend Doxycycline 100mg BID PO and Ceftriaxone 2gm Q24 to cover for possible water borne pathogens (Vibrio vulnificus, Strep, aeromonas, erysipelothrix rhusiopathiae)

## 2022-07-27 NOTE — DISCHARGE NOTE PROVIDER - HOSPITAL COURSE
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  Did not hit them on anything, didn't have any trauma, no new exposures, was only surface swimming was not diving in brackish water by the [place].  Redness and swelling is symmetrical associated with feeing tense in his forearms.   He was treated with iv steroid and benadryl. He was seen by ID and Derm and cleared by them for disc home today, spoke to Attending. 36 yo male with no PMH who is an [employer]  [employer] . He was doing drills and swimming earlier today [facility] when he noticed he had some swelling and redness of b/l forearms.  Did not hit them on anything, didn't have any trauma, no new exposures, was only surface swimming was not diving in brackish water by the [place].  Redness and swelling is symmetrical associated with feeing tense in his forearms.   He was treated with iv steroid and benadryl. He was seen by ID and Derm and cleared by them for disc home today, spoke to Attending.  XRays of upper extremities show no Fx, dislocation and CT upper extremities show moderate to severe subcut edema R>L without abscess or gas. 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 He was doing drills and swimming earlier today [facility] when he noticed he had some swelling and redness of b/l forearms.  Did not hit them on anything, didn't have any trauma, no new exposures, was only surface swimming was not diving in brackish water by the [place].  Redness and swelling is symmetrical associated with feeing tense in his forearms.   He was treated with iv steroid and benadryl. He was seen by ID and Derm and cleared by them for disc home today, spoke to Attending.  XRays of upper extremities show no Fx, dislocation and CT upper extremities show moderate to severe subcut edema R>L without abscess or gas. Blood cultures are pending.

## 2022-07-27 NOTE — CONSULT NOTE ADULT - SUBJECTIVE AND OBJECTIVE BOX
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     Dermatology consulted for arm edema and pruritus.  Patient reports he did not hit them on anything, didn't have any trauma, no new exposures, was only surface swimming was not diving in brackish water by the [place].  Redness and swelling is symmetrical associated with feeing tense in his forearms. No numbness distal to swelling.  No new soaps, detergents, pets, or any other known exposures. Other people were in the water with him but he is the only one with a rash.  No fevers no chills. He does note significant pruritus.   No other individuals are having similar reaction. He notes that water appeared murky (current heatwave)  he is alert, comfortable without pain  - notes local pruritus on posterior forearms w edema     PAST MEDICAL & SURGICAL HISTORY:      REVIEW OF SYSTEMS      General:	    Skin/Breast:  	  Ophthalmologic:  	  ENMT:	    Respiratory and Thorax:  	  Cardiovascular:	    Gastrointestinal:	    Genitourinary:	    Musculoskeletal:	    Neurological:	    Psychiatric:	    Hematology/Lymphatics:	    Endocrine:	    Allergic/Immunologic:	    MEDICATIONS  (STANDING):  cefTRIAXone   IVPB 2000 milliGRAM(s) IV Intermittent every 24 hours  doxycycline monohydrate Capsule 100 milliGRAM(s) Oral every 12 hours  famotidine    Tablet 20 milliGRAM(s) Oral daily  loratadine 10 milliGRAM(s) Oral daily  methylPREDNISolone sodium succinate Injectable 20 milliGRAM(s) IV Push two times a day    MEDICATIONS  (PRN):      Allergies    No Known Allergies    Intolerances        SOCIAL HISTORY:  [employer]    FAMILY HISTORY:      Vital Signs Last 24 Hrs  T(C): 36.4 (27 Jul 2022 05:02), Max: 36.9 (26 Jul 2022 19:57)  T(F): 97.6 (27 Jul 2022 05:02), Max: 98.4 (26 Jul 2022 19:57)  HR: 67 (27 Jul 2022 05:02) (59 - 77)  BP: 123/72 (27 Jul 2022 05:02) (113/71 - 123/72)  BP(mean): --  RR: 18 (27 Jul 2022 05:02) (16 - 18)  SpO2: 97% (27 Jul 2022 05:02) (95% - 98%)    Parameters below as of 27 Jul 2022 05:02  Patient On (Oxygen Delivery Method): room air        PHYSICAL EXAM:      Constitutional:    Eyes:    ENMT:    Neck:    Breasts:    Back:    Respiratory:    Cardiovascular:    Gastrointestinal:    Genitourinary:    Rectal:    Extremities:    Vascular:    Neurological:    Skin:    Lymph Nodes:    Musculoskeletal:    Psychiatric:        LABS:                        17.3   12.12 )-----------( 283      ( 27 Jul 2022 08:41 )             50.6     07-27    135  |  100  |  15  ----------------------------<  129<H>  4.2   |  23  |  0.91    Ca    9.5      27 Jul 2022 08:41    TPro  7.6  /  Alb  4.7  /  TBili  0.8  /  DBili  x   /  AST  25  /  ALT  21  /  AlkPhos  56  07-26            RADIOLOGY & ADDITIONAL STUDIES:   HPI:  38 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     Dermatology consulted for arm edema and pruritus.  Patient reports he did not hit them on anything, didn't have any trauma, no new exposures, was only surface swimming was not diving in brackish water by the [place].  Redness and swelling is symmetrical associated with feeing tense in his forearms. No numbness distal to swelling.  No new soaps, detergents, pets, or any other known exposures. Other people were in the water with him but he is the only one with a rash.  No fevers no chills. He does note significant pruritus. No joint pain, vision changes, headache.  No hx of previous  No other individuals are having similar reaction. He notes that water appeared murky    PAST MEDICAL & SURGICAL HISTORY:  none      REVIEW OF SYSTEMS    MEDICATIONS  (STANDING):  cefTRIAXone   IVPB 2000 milliGRAM(s) IV Intermittent every 24 hours  doxycycline monohydrate Capsule 100 milliGRAM(s) Oral every 12 hours  famotidine    Tablet 20 milliGRAM(s) Oral daily  loratadine 10 milliGRAM(s) Oral daily  methylPREDNISolone sodium succinate Injectable 20 milliGRAM(s) IV Push two times a day    MEDICATIONS  (PRN):      Allergies  No Known Allergies  Intolerances        SOCIAL HISTORY:  [employer]    FAMILY HISTORY:      Vital Signs Last 24 Hrs  T(C): 36.4 (27 Jul 2022 05:02), Max: 36.9 (26 Jul 2022 19:57)  T(F): 97.6 (27 Jul 2022 05:02), Max: 98.4 (26 Jul 2022 19:57)  HR: 67 (27 Jul 2022 05:02) (59 - 77)  BP: 123/72 (27 Jul 2022 05:02) (113/71 - 123/72)  BP(mean): --  RR: 18 (27 Jul 2022 05:02) (16 - 18)  SpO2: 97% (27 Jul 2022 05:02) (95% - 98%)    Parameters below as of 27 Jul 2022 05:02  Patient On (Oxygen Delivery Method): room air        PHYSICAL EXAM:  The patient was alert and oriented X 3, well nourished, and in no apparent distress. Oropharynx showed no ulcerations. There was no visible lymphadenopathy. Conjunctiva were non-injected.  The scalp, hair, face, eyebrows, lips, oropharynx , neck, chest, back, buttocks, extremities X 4, hands, nails were examined. There was no hyperhidrosis or bromhidrosis.   The following lesions are noted:  - edema of b/l forearms and overlying ill-defined erythema  - few small pink papules on R arm and small scaly pink plaque on L medial forearm      LABS:                        17.3   12.12 )-----------( 283      ( 27 Jul 2022 08:41 )             50.6     07-27    135  |  100  |  15  ----------------------------<  129<H>  4.2   |  23  |  0.91    Ca    9.5      27 Jul 2022 08:41    TPro  7.6  /  Alb  4.7  /  TBili  0.8  /  DBili  x   /  AST  25  /  ALT  21  /  AlkPhos  56  07-26      RADIOLOGY & ADDITIONAL STUDIES:  Xray forearms  IMPRESSION:  Generalized soft tissue swelling consistent with history of cellulitis.   No tracking soft tissue gas collections, radiopaque foreign bodies, or   gross radiographic evidence for osteomyelitis.    No fractures or dislocations.    Preserved elbow and wrist joint spaces and no joint margin erosions.    No discrete lytic or blastic lesions.      XRay elbow  IMPRESSION:  Generalized soft tissue swelling consistent with history of cellulitis.   No tracking soft tissue gas collections, radiopaque foreign bodies, or   gross radiographic evidence for osteomyelitis.    No fractures or dislocations.    Preserved elbow and wrist joint spaces and no joint margin erosions.    No discrete lytic or blastic lesions.      CT forearms  IMPRESSION:  1. Moderate-to-severe subcutaneous edema of the forearms, right greater   than left, without appreciated abscess or gas.

## 2022-07-27 NOTE — PROGRESS NOTE ADULT - ASSESSMENT
37M [employer] was doing drills and swimming earlier today 7/26/22  when he noticed he had some swelling and redness of b/l forearms.  Did not hit them on anything, didn't have any trauma, no new exposures, was only surface swimming was not diving.  Redness and swelling is symmetrical associated with feeing tense in his forearms. No numbness distal to swelling.  No new soaps, detergents, pets, or any other known exposures.  Never had anything like this before, no swelling of lips or tongue, no difficulty breathing or swallowing.  Other people were in the water with him but he is the only one with a rash.  Nothing makes it better or worse.  no fevers no chills.  + Pruritic    DIAGNOSIS and IMPRESSION  B/L forearm Cellulitis    Afebrile, no leukocytosis, non toxic  No pain, significant itching, symmetrical lesion w/o wound  Pt did have a bug bite today   - while atyplcal presentation of cellulitis with possible waterborne organism is possible, the distribution and absence of clinical toxicity suggests a toxic exposure  skin irritation can result from exposure to toxins associated with algae. In salt and brackish water Diatoms and Dinoflagellates are implicated  -he notes water was murky  ([facility] [place] on [facility])    Decadron 7/26  Solumedrol 7/27 -->    DERMATOLOGY CONSULTATION in progress    rate of improvement and findings inconsistent with infection. Responding to steroids    RECOMMENDATIONS  Stop Doxycycline 100mg BID PO and Ceftriaxone 2gm Q24   maintain off antibiotics  I favor discharge home with rapid steroid taper.

## 2022-07-27 NOTE — PROGRESS NOTE ADULT - SUBJECTIVE AND OBJECTIVE BOX
Patient is a 37y old  Male who presents with a chief complaint of anaphylaxis (26 Jul 2022 20:07)      SUBJECTIVE / OVERNIGHT EVENTS: erythema on forearms is worse, edema about the same, pruritis is worse. afebrile    MEDICATIONS  (STANDING):  cefTRIAXone   IVPB 2000 milliGRAM(s) IV Intermittent every 24 hours  doxycycline monohydrate Capsule 100 milliGRAM(s) Oral every 12 hours  famotidine    Tablet 20 milliGRAM(s) Oral daily  loratadine 10 milliGRAM(s) Oral daily  methylPREDNISolone 4 milliGRAM(s) Oral two times a day    MEDICATIONS  (PRN):      Vital Signs Last 24 Hrs  T(F): 97.6 (07-27-22 @ 05:02), Max: 98.4 (07-26-22 @ 19:57)  HR: 67 (07-27-22 @ 05:02) (59 - 77)  BP: 123/72 (07-27-22 @ 05:02) (113/71 - 123/72)  RR: 18 (07-27-22 @ 05:02) (16 - 18)  SpO2: 97% (07-27-22 @ 05:02) (95% - 98%)  Telemetry:   CAPILLARY BLOOD GLUCOSE        I&O's Summary      PHYSICAL EXAM:  GENERAL: NAD, well-developed  HEAD:  Atraumatic, Normocephalic  EYES: EOMI, PERRLA, conjunctiva and sclera clear  NECK: Supple, No JVD  CHEST/LUNG: Clear to auscultation bilaterally; No wheeze  HEART: Regular rate and rhythm; No murmurs, rubs, or gallops  ABDOMEN: Soft, Nontender, Nondistended; Bowel sounds present  EXTREMITIES:  2+ Peripheral Pulses, No clubbing, cyanosis, or edema  PSYCH: AAOx3  NEUROLOGY: non-focal  SKIN: No rashes or lesions    LABS:                        17.4   10.13 )-----------( 263      ( 26 Jul 2022 14:23 )             49.8     07-26    137  |  101  |  17  ----------------------------<  86  3.7   |  20<L>  |  0.97    Ca    9.1      26 Jul 2022 14:23    TPro  7.6  /  Alb  4.7  /  TBili  0.8  /  DBili  x   /  AST  25  /  ALT  21  /  AlkPhos  56  07-26              RADIOLOGY & ADDITIONAL TESTS:    Imaging Personally Reviewed:    Consultant(s) Notes Reviewed:      Care Discussed with Consultants/Other Providers:

## 2022-07-27 NOTE — DISCHARGE NOTE PROVIDER - CARE PROVIDER_API CALL
Reginald Spears)  Medicine  Dermatology  [address] [facility] [address]  [place] [address]  Phone: [phone]  Fax: [phone]  Follow Up Time:     ARIEL MORALES  Internal Medicine  [address] [place] [address]  Phone: ()-  Fax: ()-  Follow Up Time:

## 2022-07-27 NOTE — PROGRESS NOTE ADULT - ASSESSMENT
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  He did not hit them on anything, didn't have any trauma, no new exposures, was only surface swimming was not diving.  Redness and swelling is symmetrical associated with feeing tense in his forearms. No numbness distal to swelling.  No new soaps, detergents, pets, or any other known exposures.  Never had anything like this before, no swelling of lips or tongue, no difficulty breathing or swallowing.  Other people were in the water with him but he is the only one with a rash.  Nothing makes it better or worse.  no fevers no chills.  + Pruritic    DIAGNOSIS and IMPRESSION  ·	B/L forearm Cellulitis     no leukocytosis, non toxic  Pt did have a bug bite the day of admission  erythema on forearms is worse, edema about the same, pruritis is worse. afebrile  ID consult following  blood cx NTD  CT w contrast b/l UE  derm consult called to r/o non-infectious etiology given nontoxic presentation and significant pruritis  ptn received decadron, ancef, benadryl in the ED  cont on Doxycycline 100mg BID PO and Ceftriaxone 2gm Q24 to cover for possible water borne pathogens (Vibrio vulnificus, Strep, aeromonas, erysipelothrix rhusiopathiae)  will cont steroids but bhange from po to IV medrol 20 mg q12H  cont  H1 and H2 blocking antihistamines  dvt ppx w ambulation

## 2022-07-27 NOTE — DISCHARGE NOTE PROVIDER - NSDCCPCAREPLAN_GEN_ALL_CORE_FT
PRINCIPAL DISCHARGE DIAGNOSIS  Diagnosis: Rash  Assessment and Plan of Treatment: resolved, complete Prednisone as ordered, you can use over the counter benadryl as needed for itching, follow up with Derm and PCP      SECONDARY DISCHARGE DIAGNOSES  Diagnosis: Cellulitis of upper extremity  Assessment and Plan of Treatment: resolved, no more antibiotics

## 2022-07-27 NOTE — PROGRESS NOTE ADULT - SUBJECTIVE AND OBJECTIVE BOX
Follow Up:  bilateral UE erythema    Interval History/ROS:  feels ok  some pruritus    Allergies  No Known Allergies    ANTIMICROBIALS:  cefTRIAXone   IVPB 2000 every 24 hours  doxycycline monohydrate Capsule 100 every 12 hours      OTHER MEDS:  MEDICATIONS  (STANDING):  famotidine    Tablet 20 daily  loratadine 10 daily  methylPREDNISolone sodium succinate Injectable 20 two times a day      Vital Signs Last 24 Hrs  T(C): 36.7 (27 Jul 2022 12:43), Max: 36.9 (26 Jul 2022 19:57)  T(F): 98.1 (27 Jul 2022 12:43), Max: 98.4 (26 Jul 2022 19:57)  HR: 70 (27 Jul 2022 12:43) (67 - 77)  BP: 136/77 (27 Jul 2022 12:43) (113/71 - 136/77)  BP(mean): --  RR: 18 (27 Jul 2022 12:43) (18 - 18)  SpO2: 98% (27 Jul 2022 12:43) (95% - 98%)    Parameters below as of 27 Jul 2022 12:43  Patient On (Oxygen Delivery Method): room air        PHYSICAL EXAM:  General: WN/WD NAD, Non-toxic  Neurology: A&Ox3, nonfocal  Respiratory: Clear to auscultation bilaterally  CV: RRR, S1S2, no murmurs, rubs or gallops  Abdominal: Soft, Non-tender, non-distended, normal bowel sounds  Extremities: No edema,   Line Sites: Clear  Skin: rash has faded, decreased erythema, distribution  more diffuse with decreased swelling                          17.3   12.12 )-----------( 283      ( 27 Jul 2022 08:41 )             50.6   WBC Count: 12.12 (07-27 @ 08:41)  WBC Count: 10.13 (07-26 @ 14:23)      07-27    135  |  100  |  15  ----------------------------<  129<H>  4.2   |  23  |  0.91    Ca    9.5      27 Jul 2022 08:41    TPro  7.6  /  Alb  4.7  /  TBili  0.8  /  DBili  x   /  AST  25  /  ALT  21  /  AlkPhos  56  07-26    MICROBIOLOGY:      RADIOLOGY:  < from: CT Upper Extremity w/wo IV Cont, Bilateral (07.27.22 @ 12:39) >  FINDINGS:    RIGHT  OSSEOUS STRUCTURES    Fractures:  None.    RIGHT ELBOW JOINTS    Joint Space(s):  Maintained.    Effusion:  None.    RIGHT WRIST JOINTS    Joint Space(s):  Maintained.    MYOTENDINOUS STRUCTURES  Intact within limits of CT.    NEUROVASCULAR STRUCTURES  Unremarkable within limits of CT.    OTHER SOFT TISSUES  Moderate to severe subcutaneous edema is present with dorsal   predominance. No abscess or gas is seen.    LEFT  OSSEOUS STRUCTURES    Fractures:  None.    LEFT ELBOW JOINTS    Joint Space(s):  Maintained. Tiny osteophyte is present along the   coronoid process. A couple of tiny joint bodies/calcifications are noted   along the lateral margin of the radiocapitellar joint.    Effusion:  None.    LEFT WRIST JOINTS    Joint Space(s):  Maintained.    MYOTENDINOUS STRUCTURES  Intact within limits of CT.    NEUROVASCULAR STRUCTURES  Unremarkable within limits of CT. IV catheter is present within the   dorsum of the hand.    OTHER SOFT TISSUES  Moderate subcutaneous edema is present with dorsal predominance. No   abscess or gas is seen.    IMPRESSION:  1. Moderate-to-severe subcutaneous edema of the forearms, right greater   than left, without appreciated abscess or gas.    < end of copied text >      Parish Cates MD; Division of Infectious Disease; Pager: [phone]; nights and weekends: [phone]

## 2022-07-31 LAB
CULTURE RESULTS: SIGNIFICANT CHANGE UP
CULTURE RESULTS: SIGNIFICANT CHANGE UP
SPECIMEN SOURCE: SIGNIFICANT CHANGE UP
SPECIMEN SOURCE: SIGNIFICANT CHANGE UP

## 2022-12-14 ENCOUNTER — NON-APPOINTMENT (OUTPATIENT)
Age: 37
End: 2022-12-14

## 2023-07-20 ENCOUNTER — NON-APPOINTMENT (OUTPATIENT)
Age: 38
End: 2023-07-20

## 2023-07-20 ENCOUNTER — APPOINTMENT (OUTPATIENT)
Dept: OPHTHALMOLOGY | Facility: CLINIC | Age: 38
End: 2023-07-20
Payer: COMMERCIAL

## 2023-07-20 PROCEDURE — 11900 INJECT SKIN LESIONS </W 7: CPT | Mod: RT

## 2023-07-20 PROCEDURE — 92285 EXTERNAL OCULAR PHOTOGRAPHY: CPT

## 2023-07-20 PROCEDURE — 99203 OFFICE O/P NEW LOW 30 MIN: CPT | Mod: 25

## 2023-08-11 ENCOUNTER — APPOINTMENT (OUTPATIENT)
Dept: OPHTHALMOLOGY | Facility: CLINIC | Age: 38
End: 2023-08-11
Payer: COMMERCIAL

## 2023-08-11 ENCOUNTER — NON-APPOINTMENT (OUTPATIENT)
Age: 38
End: 2023-08-11

## 2023-08-11 PROCEDURE — 92285 EXTERNAL OCULAR PHOTOGRAPHY: CPT

## 2023-08-11 PROCEDURE — 11900 INJECT SKIN LESIONS </W 7: CPT | Mod: E4

## 2023-09-07 ENCOUNTER — APPOINTMENT (OUTPATIENT)
Dept: OPHTHALMOLOGY | Facility: CLINIC | Age: 38
End: 2023-09-07

## 2024-10-10 ENCOUNTER — NON-APPOINTMENT (OUTPATIENT)
Age: 39
End: 2024-10-10